# Patient Record
Sex: MALE | Race: WHITE | NOT HISPANIC OR LATINO | Employment: OTHER | URBAN - METROPOLITAN AREA
[De-identification: names, ages, dates, MRNs, and addresses within clinical notes are randomized per-mention and may not be internally consistent; named-entity substitution may affect disease eponyms.]

---

## 2018-03-08 ENCOUNTER — HOSPITAL ENCOUNTER (EMERGENCY)
Facility: HOSPITAL | Age: 38
Discharge: HOME | End: 2018-03-08
Attending: EMERGENCY MEDICINE | Admitting: EMERGENCY MEDICINE
Payer: SUBSIDIZED

## 2018-03-08 VITALS
WEIGHT: 287 LBS | RESPIRATION RATE: 20 BRPM | HEART RATE: 115 BPM | OXYGEN SATURATION: 98 % | TEMPERATURE: 97.6 F | DIASTOLIC BLOOD PRESSURE: 97 MMHG | SYSTOLIC BLOOD PRESSURE: 198 MMHG

## 2018-03-08 DIAGNOSIS — K02.9 DENTAL CARIES: ICD-10-CM

## 2018-03-08 DIAGNOSIS — K04.7 DENTAL ABSCESS: Primary | ICD-10-CM

## 2018-03-08 PROCEDURE — 99283 EMERGENCY DEPT VISIT LOW MDM: CPT

## 2018-03-08 RX ORDER — CLINDAMYCIN HYDROCHLORIDE 300 MG/1
300 CAPSULE ORAL EVERY 8 HOURS
Qty: 21 CAPSULE | Refills: 0 | Status: SHIPPED | OUTPATIENT
Start: 2018-03-08 | End: 2018-03-15

## 2018-03-08 RX ORDER — CLINDAMYCIN HYDROCHLORIDE 150 MG/1
300 CAPSULE ORAL ONCE
Status: COMPLETED | OUTPATIENT
Start: 2018-03-08 | End: 2018-03-08

## 2018-03-08 RX ORDER — NAPROXEN 500 MG/1
500 TABLET ORAL 2 TIMES DAILY WITH MEALS
Qty: 20 TABLET | Refills: 0 | Status: SHIPPED | OUTPATIENT
Start: 2018-03-08 | End: 2021-06-29

## 2018-03-08 RX ORDER — NAPROXEN 500 MG/1
500 TABLET ORAL ONCE
Status: COMPLETED | OUTPATIENT
Start: 2018-03-08 | End: 2018-03-08

## 2018-03-08 RX ADMIN — CLINDAMYCIN HYDROCHLORIDE 300 MG: 150 CAPSULE ORAL at 06:40

## 2018-03-08 RX ADMIN — NAPROXEN 500 MG: 500 TABLET ORAL at 06:40

## 2018-03-08 NOTE — DISCHARGE INSTRUCTIONS
Close return instructions given to return to the ER for any worsening symptoms  Patient agrees with discharge plan  Patient well appearing at time of discharge  Dental Abscess   WHAT YOU NEED TO KNOW:   A dental abscess is a collection of pus in or around a tooth  A dental abscess is caused by bacteria  The bacteria usually enter the tooth when the enamel (outer part of the tooth) is damaged by tooth decay  Bacteria may also enter the tooth through a break or chip in the tooth, or a cut in the gum  Food particles that are stuck between the teeth for a long time may also lead to an abscess  DISCHARGE INSTRUCTIONS:   Return to the emergency department if:   · You have severe pain  · You have trouble breathing because of pain or swelling  Contact your healthcare provider if:   · Your symptoms get worse, even after treatment  · Your mouth is bleeding  · You cannot eat or drink because of pain or swelling  · Your abscess returns  · You have an injury that causes a crack in your tooth  · You have questions or concerns about your condition or care  Medicines: You may  need any of the following:  · Antibiotics  help treat a bacterial infection  · NSAIDs , such as ibuprofen, help decrease swelling, pain, and fever  This medicine is available with or without a doctor's order  NSAIDs can cause stomach bleeding or kidney problems in certain people  If you take blood thinner medicine, always ask your healthcare provider if NSAIDs are safe for you  Always read the medicine label and follow directions  · Acetaminophen  decreases pain and fever  It is available without a doctor's order  Ask how much to take and how often to take it  Follow directions  Read the labels of all other medicines you are using to see if they also contain acetaminophen, or ask your doctor or pharmacist  Acetaminophen can cause liver damage if not taken correctly   Do not use more than 4 grams (4,000 milligrams) total of acetaminophen in one day  · Prescription pain medicine  may be given  Ask your healthcare provider how to take this medicine safely  Some prescription pain medicines contain acetaminophen  Do not take other medicines that contain acetaminophen without talking to your healthcare provider  Too much acetaminophen may cause liver damage  Prescription pain medicine may cause constipation  Ask your healthcare provider how to prevent or treat constipation  · Take your medicine as directed  Contact your healthcare provider if you think your medicine is not helping or if you have side effects  Tell him of her if you are allergic to any medicine  Keep a list of the medicines, vitamins, and herbs you take  Include the amounts, and when and why you take them  Bring the list or the pill bottles to follow-up visits  Carry your medicine list with you in case of an emergency  Self-care:   · Rinse your mouth every 2 hours with salt water  This will help keep the area clean  · Gently brush your teeth twice a day with a soft tooth brush  This will help keep the area clean  · Eat soft foods as directed  Soft foods may cause less pain  Examples include applesauce, yogurt, and cooked pasta  Ask your healthcare provider how long to follow this instruction  · Apply a warm compress to your tooth or gum  Use a cotton ball or gauze soaked in warm water  Remove the compress in 10 minutes or when it becomes cool  Repeat 3 times a day  Prevent another abscess:   · Brush your teeth at least 2 times a day with fluoride toothpaste  · Use dental floss to clean between your teeth at least once a day  · Rinse your mouth with water or mouthwash after meals and snacks  · Chew sugarless gum after meals and snacks  · Limit foods that are sticky and high in sugar such as raisons  Also limit drinks high in sugar, such as soda  · See your dentist every 6 months for dental cleanings and oral exams    Follow up with your healthcare provider in 24 hours: Your healthcare provider will need to check your teeth and gums  Write down your questions so you remember to ask them during your visits  © 2017 [address] Information is for End User's use only and may not be sold, redistributed or otherwise used for commercial purposes  All illustrations and images included in CareNotes® are the copyrighted property of A D [employer]  or [employer]  The above information is an  only  It is not intended as medical advice for individual conditions or treatments  Talk to your doctor, nurse or pharmacist before following any medical regimen to see if it is safe and effective for you  Dental Caries   WHAT YOU NEED TO KNOW:   Dental caries are also called cavities  Cavities are caused by bacteria  When the bacteria in tooth plaque (sticky film) mix with certain types of carbohydrate, this creates acid  The acid breaks down areas of enamel, which covers the outside of a tooth  This creates a small hole in the tooth called a cavity  DISCHARGE INSTRUCTIONS:   Return to the emergency department if:   · Your face, jaw, cheek, eye, or neck begin to swell  Contact your dentist if:   · You have a fever  · Your tooth pain gets worse  · You have questions or concerns about your condition or care  Follow up with your dentist as directed:  Write down your questions so you remember to ask them during your visits  Prevent dental caries:   · Brush your teeth at least 2 times a day with fluoride toothpaste  · Use dental floss to clean between your teeth at least once a day  · Rinse your mouth with water or mouthwash after meals and snacks  · Chew sugarless gum after meals and snacks  · See your dentist regularly for dental cleanings and oral exams    © 2017 [address] is for End User's use only and may not be sold, redistributed or otherwise used for commercial purposes  All illustrations and images included in CareNotes® are the copyrighted property of [employer] M [employer]  or [employer]  The above information is an  only  It is not intended as medical advice for individual conditions or treatments  Talk to your doctor, nurse or pharmacist before following any medical regimen to see if it is safe and effective for you

## 2018-03-08 NOTE — ED PROVIDER NOTES
History  Chief Complaint   Patient presents with    Dental Pain     started Tuesday and has gotten progessively worse    Facial Swelling     right side of jaw     78-year-old male presents to the ER with complaint of right lower jaw pain for the past 2 days  Patient states that he has history of dental caries and tooth aches in the past   Two days ago who he was chewing gum and he felt a popping sensation in his right lower jaw  Since then he has some pain and mild swelling to the right lower jaw  Patient came to the ER for further evaluation  History provided by:  Patient  Dental Pain   Associated symptoms: no congestion, no fever and no headaches        None       Past Medical History:   Diagnosis Date    Hypertension        History reviewed  No pertinent surgical history  History reviewed  No pertinent family history  I have reviewed and agree with the history as documented  Social History   Substance Use Topics    Smoking status: Heavy Tobacco Smoker    Smokeless tobacco: Never Used    Alcohol use Yes      Comment: social        Review of Systems   Constitutional: Negative for activity change, fatigue and fever  HENT: Positive for dental problem  Negative for congestion, ear discharge and sore throat  Eyes: Negative for pain and redness  Respiratory: Negative for cough, chest tightness, shortness of breath and wheezing  Cardiovascular: Negative for chest pain  Gastrointestinal: Negative for abdominal pain, diarrhea, nausea and vomiting  Endocrine: Negative for cold intolerance  Genitourinary: Negative for dysuria and urgency  Musculoskeletal: Negative for arthralgias and back pain  Neurological: Negative for dizziness, weakness and headaches  Psychiatric/Behavioral: Negative for agitation and behavioral problems         Physical Exam  ED Triage Vitals [03/08/18 0612]   Temperature Pulse Respirations Blood Pressure SpO2   97 6 °F (36 4 °C) (!) 115 20 (!) 198/97 98 % Temp Source Heart Rate Source Patient Position - Orthostatic VS BP Location FiO2 (%)   Tympanic Monitor Lying Left arm --      Pain Score       8           Orthostatic Vital Signs  Vitals:    03/08/18 0612   BP: (!) 198/97   Pulse: (!) 115   Patient Position - Orthostatic VS: Lying       Physical Exam   Constitutional: He is oriented to person, place, and time  He appears well-developed and well-nourished  HENT:   Head: Normocephalic and atraumatic  Nose: Nose normal    Mouth/Throat: Oropharynx is clear and moist    Dental caries and poor dentition noted throughout upper and lower teeth  1 cm area of induration noted below the gum line below the right lower canine without any fluctuance  The area is tender to palpation  Eyes: Conjunctivae and EOM are normal    Neck: Normal range of motion  Neck supple  Cardiovascular: Normal rate, regular rhythm and normal heart sounds  Pulmonary/Chest: Effort normal and breath sounds normal    Abdominal: Soft  Bowel sounds are normal  He exhibits no distension  There is no tenderness  Musculoskeletal: Normal range of motion  Neurological: He is alert and oriented to person, place, and time  Skin: Skin is warm  Psychiatric: He has a normal mood and affect  His behavior is normal  Judgment and thought content normal    Nursing note and vitals reviewed        ED Medications  Medications   naproxen (NAPROSYN) tablet 500 mg (500 mg Oral Given 3/8/18 0640)   clindamycin (CLEOCIN) capsule 300 mg (300 mg Oral Given 3/8/18 0640)       Diagnostic Studies  Results Reviewed     None                 No orders to display              Procedures  Procedures       Phone Contacts  ED Phone Contact    ED Course  ED Course                                MDM  Number of Diagnoses or Management Options  Dental abscess: new and requires workup  Dental caries: new and requires workup  Risk of Complications, Morbidity, and/or Mortality  General comments: History and physical consistent with dental caries and a possible developing dental abscess to the right lower jaw  Patient is placed on clindamycin  Patient discharged home on p r n  naproxen for pain and clindamycin along with follow-up to dentist   Patient was given phone numbers to local dentists in her area if he is unable to follow up with his own dentist  Close return instructions given to return to the ER for any worsening symptoms  Patient agrees with discharge plan  Patient well appearing at time of discharge  Patient Progress  Patient progress: stable    CritCare Time    Disposition  Final diagnoses:   Dental abscess   Dental caries     Time reflects when diagnosis was documented in both MDM as applicable and the Disposition within this note     Time User Action Codes Description Comment    3/8/2018  6:36 AM Marix Jameson Add [K04 7] Dental abscess     3/8/2018  6:38 AM Farida Garber Add [K02 9] Dental caries       ED Disposition     ED Disposition Condition Comment    Discharge  Keagan Juniorcarlitos Iza discharge to home/self care  Condition at discharge: Good        Follow-up Information     Follow up With Specialties Details Why Contact Info       A soon as possible Please follow up with the dentist with the paperwork that we gave you        Discharge Medication List as of 3/8/2018  6:39 AM      START taking these medications    Details   clindamycin (CLEOCIN) 300 MG capsule Take 1 capsule (300 mg total) by mouth every 8 (eight) hours for 7 days, Starting Thu 3/8/2018, Until Thu 3/15/2018, Print      naproxen (EC NAPROSYN) 500 MG EC tablet Take 1 tablet (500 mg total) by mouth 2 (two) times a day with meals, Starting Thu 3/8/2018, Print           No discharge procedures on file      ED Provider  Electronically Signed by           Shaniqua Kothari DO  03/08/18 9350

## 2021-06-29 ENCOUNTER — OFFICE VISIT (OUTPATIENT)
Dept: OBGYN CLINIC | Facility: CLINIC | Age: 41
End: 2021-06-29
Payer: COMMERCIAL

## 2021-06-29 VITALS — BODY MASS INDEX: 38.41 KG/M2 | WEIGHT: 289.8 LBS | HEIGHT: 73 IN

## 2021-06-29 DIAGNOSIS — E08.40 DIABETES MELLITUS DUE TO UNDERLYING CONDITION WITH DIABETIC NEUROPATHY, UNSPECIFIED WHETHER LONG TERM INSULIN USE (HCC): ICD-10-CM

## 2021-06-29 DIAGNOSIS — M19.072 OSTEOARTHRITIS OF LEFT ANKLE, UNSPECIFIED OSTEOARTHRITIS TYPE: Primary | ICD-10-CM

## 2021-06-29 PROCEDURE — 99204 OFFICE O/P NEW MOD 45 MIN: CPT | Performed by: ORTHOPAEDIC SURGERY

## 2021-06-29 RX ORDER — HYDROCHLOROTHIAZIDE 25 MG/1
25 TABLET ORAL DAILY
COMMUNITY
Start: 2020-11-20 | End: 2021-11-15

## 2021-06-29 RX ORDER — METOPROLOL SUCCINATE 200 MG/1
200 TABLET, EXTENDED RELEASE ORAL DAILY
COMMUNITY

## 2021-06-29 RX ORDER — METFORMIN HYDROCHLORIDE 750 MG/1
750 TABLET, EXTENDED RELEASE ORAL 2 TIMES DAILY WITH MEALS
COMMUNITY
Start: 2021-06-20

## 2021-06-29 RX ORDER — ASPIRIN 81 MG/1
81 TABLET, CHEWABLE ORAL DAILY
COMMUNITY
Start: 2021-05-17

## 2021-06-29 RX ORDER — LISINOPRIL 40 MG/1
40 TABLET ORAL DAILY
COMMUNITY
Start: 2021-04-13

## 2021-06-29 RX ORDER — AMLODIPINE BESYLATE 5 MG/1
5 TABLET ORAL DAILY
COMMUNITY

## 2021-06-29 RX ORDER — PREGABALIN 75 MG/1
75 CAPSULE ORAL DAILY
COMMUNITY

## 2021-06-29 RX ORDER — ATORVASTATIN CALCIUM 40 MG/1
40 TABLET, FILM COATED ORAL DAILY
COMMUNITY
Start: 2021-06-21

## 2021-06-29 RX ORDER — CLOPIDOGREL BISULFATE 75 MG/1
75 TABLET ORAL DAILY
COMMUNITY
Start: 2021-06-20

## 2021-06-29 RX ORDER — FAMOTIDINE 20 MG/1
20 TABLET, FILM COATED ORAL 2 TIMES DAILY PRN
COMMUNITY
Start: 2021-04-07

## 2021-06-29 NOTE — PROGRESS NOTES
Assessment/Plan:  1  Osteoarthritis of left ankle, unspecified osteoarthritis type  Ambulatory referral to Orthopedic Surgery   2  Diabetes mellitus due to underlying condition with diabetic neuropathy, unspecified whether long term insulin use ([facility] [address] )         Scribe Attestation    I,:  Brett Gaffney am acting as a scribe while in the presence of the attending physician :       I,:  Steve Noel MD personally performed the services described in this documentation    as scribed in my presence :               Randell De León upon examination and review of the x-rays of the left ankle as well as review of the MRI of the left ankle does demonstrate severe osteoarthritis of the tibiotalar joint  He is unable to dorsiflex or plantar flex his foot secondary to the severe arthritic nature of the ankle joint  He does demonstrate functional strength in all planes  Unfortunately, the severe arthritic nature of the ankle has developed significant dysfunction in his ambulation  I did note that the findings on the MRI as well as the x-rays would qualify him for a total ankle arthroplasty  Typically, I would refer him to Dr Macie Abbasi our foot and ankle specialist in [place]  Unfortunately, his insurance is a prohibitive factor in this and will likely have to seek a foot and ankle fellowship trained surgeon within [place]  I did note that in the meantime he could consider physical therapy to work on function of his ankle  He did defer this today  I am hesitant to place him in an ankle brace as he demonstrates significant instability secondary to the arthritic nature of the ankle  Additionally, I did note that he is at an increased risk of Charcot joint development due to the diabetes and neuropathy  Randell De León verbalized understanding of all information provided to him today and had no further questions    He will contact his insurance and try and seek medical care from a fellowship trained foot and ankle surgeon within his network  He will follow up with me on an as-needed basis  Subjective:   Yvonne Higginbotham is a 41 y o  male who presents to the office today for  Initial evaluation of his left ankle  He has been experiencing activity related pain into the ankle past several years  He has had multiple injuries resulting in stiffness and pain to his ankles  However fortunately has also encountered multiple medical issues such as diabetes as well as cardiovascular issues resulting in his cardiac surgery  He did also undergo a right lower extremity angiogram and balloon angioplasty in the right lower extremity in January 2021  He states he then had recurrent injury into his ankle  Unfortunately, he has also had complications secondary to diabetes  He notes he did have have surgery to restore circulation into the foot the right side  Unfortunately, he does have persistent neuropathy into the foot and ankle  He states that his ankle is quite stiff and notes is unable to ambulate normally  He states that this does cause significant dysfunction to running activities  He did have an MRI of the left foot ankle completed to be reviewed  Review of Systems   Constitutional: Negative for chills, fever and unexpected weight change  HENT: Negative for hearing loss, nosebleeds and sore throat  Eyes: Negative for pain, redness and visual disturbance  Respiratory: Negative for cough, shortness of breath and wheezing  Cardiovascular: Negative for chest pain, palpitations and leg swelling  Gastrointestinal: Negative for abdominal pain, nausea and vomiting  Endocrine: Negative for polyphagia and polyuria  Genitourinary: Negative for dysuria and hematuria  Musculoskeletal: Positive for arthralgias and myalgias  See HPI   Skin: Negative for rash and wound  Neurological: Negative for dizziness, numbness and headaches  Psychiatric/Behavioral: Negative for decreased concentration and suicidal ideas   The patient is not nervous/anxious  Past Medical History:   Diagnosis Date    Diabetes mellitus ([address] )     History of heart attack     Hypertension        Past Surgical History:   Procedure Laterality Date    CARDIAC SURGERY      CORONARY STENT PLACEMENT      LEFT PDA    KNEE RECONSTRUCTION, MEDIAL PATELLAR FEMORAL LIGAMENT      STent Placement       Family History   Problem Relation Age of Onset    No Known Problems Mother     No Known Problems Father        Social History     Occupational History    Not on file   Tobacco Use    Smoking status: Heavy Tobacco Smoker    Smokeless tobacco: Never Used   Vaping Use    Vaping Use: Never used   Substance and Sexual Activity    Alcohol use: Yes     Comment: social    Drug use: No    Sexual activity: Not on file         Current Outpatient Medications:     amLODIPine (NORVASC) 5 mg tablet, Take 5 mg by mouth daily, Disp: , Rfl:     aspirin 81 mg chewable tablet, Chew 81 mg daily Chew and swallow, Disp: , Rfl:     atorvastatin (LIPITOR) 40 mg tablet, Take 40 mg by mouth daily, Disp: , Rfl:     clopidogrel (PLAVIX) 75 mg tablet, Take 75 mg by mouth daily, Disp: , Rfl:     Empagliflozin 10 MG TABS, Take 10 mg by mouth daily, Disp: , Rfl:     famotidine (PEPCID) 20 mg tablet, Take 20 mg by mouth 2 (two) times a day as needed, Disp: , Rfl:     hydrochlorothiazide (HYDRODIURIL) 25 mg tablet, Take 25 mg by mouth daily, Disp: , Rfl:     lisinopril (ZESTRIL) 40 mg tablet, Take 40 mg by mouth daily, Disp: , Rfl:     metFORMIN (GLUCOPHAGE-XR) 750 mg 24 hr tablet, Take 750 mg by mouth 2 (two) times a day with meals, Disp: , Rfl:     metoprolol succinate (TOPROL-XL) 200 MG 24 hr tablet, Take 200 mg by mouth daily, Disp: , Rfl:     pregabalin (LYRICA) 75 mg capsule, Take 75 mg by mouth daily, Disp: , Rfl:     Allergies   Allergen Reactions    Naproxen Hives    Ibuprofen Hives and Rash       Objective: There were no vitals filed for this visit      Left Ankle Exam     Tenderness   Left ankle tenderness location: anterior joint line  Swelling: mild    Range of Motion   Dorsiflexion: 0   Plantar flexion: 5   Eversion: 0   Inversion: 0     Muscle Strength   Dorsiflexion:  5/5   Plantar flexion:  5/5   Posterior tibial:  5/5  Peroneal muscle:  5/5    Other   Sensation: decreased (tingleing of toes from peripheral neuropathy)  Pulse: present (1+ dorsalis pedis)    Comments:  No lesions of the foot and toes          Strength/Myotome Testing     Left Ankle/Foot   Dorsiflexion: 5  Plantar flexion: 5      Physical Exam  Vitals reviewed  HENT:      Head: Normocephalic and atraumatic  Eyes:      General:         Right eye: No discharge  Left eye: No discharge  Conjunctiva/sclera: Conjunctivae normal       Pupils: Pupils are equal, round, and reactive to light  Cardiovascular:      Rate and Rhythm: Normal rate  Pulmonary:      Effort: Pulmonary effort is normal  No respiratory distress  Musculoskeletal:      Cervical back: Normal range of motion and neck supple  Comments: As noted in HPI   Skin:     General: Skin is warm and dry  Neurological:      Mental Status: He is alert and oriented to person, place, and time  Psychiatric:         Mood and Affect: Mood normal          Behavior: Behavior normal          I have personally reviewed pertinent films in PACS and my interpretation is as follows:    X-rays of the left ankle demonstrate severe tibiotalar joint osteoarthritis  Osteophytes are present anteriorly with flattening and fragmentation of the superolateral aspect of the talar dome  MRI of the left ankle demonnstrates  Severe osteoarthritis of the tibiotalar joint osteoarthritis  Chronic tearing to the deltoid and ATFL ligaments are present